# Patient Record
Sex: FEMALE | Race: WHITE | NOT HISPANIC OR LATINO | URBAN - NONMETROPOLITAN AREA
[De-identification: names, ages, dates, MRNs, and addresses within clinical notes are randomized per-mention and may not be internally consistent; named-entity substitution may affect disease eponyms.]

---

## 2017-11-20 PROBLEM — Z96.1: Noted: 2017-11-20

## 2017-11-20 PROBLEM — H26.492: Noted: 2017-11-20

## 2017-11-20 PROBLEM — H35.363: Noted: 2017-11-20

## 2017-11-20 PROBLEM — H35.3131: Noted: 2017-11-20

## 2017-11-20 PROBLEM — H16.223: Noted: 2017-11-20

## 2017-11-20 PROBLEM — D31.31: Noted: 2017-11-20

## 2017-11-20 PROBLEM — H40.1131: Noted: 2017-11-20

## 2019-03-01 NOTE — PATIENT DISCUSSION
1.   Corneal epithelial dysplasia inferior. Risk of ROXANE/SCCA developing discussed. Recommend superficial keratectomy to remove abnormal corneal epithelium OD/OS 1 month apart. Valium 5mg. Risk of infection discussed. 2. Autoimmune related. Treatment includes frequent preservative free tears q1-2 hours topical steroids lotemax prn watch IOP topical restasis/ xiidra pt tried in past will review Dr Alejandra Jewell records to determine is she had any clinical improvement on drops. Pt taking many oral medications which will inhibit tear production3. 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 4. Pseudophakia OU - IOLs stable. Monitor. Return for an appointment for Minor Procedure. with Dr. Crystal Esparza. Eforce obtained reviewed with Doctor advised  and report obtained. Patient does have a increased risk.

## 2019-03-01 NOTE — PATIENT DISCUSSION
Autoimmune related. Treatment includes frequent preservative free tears q1-2 hours topical steroids lotemax prn watch IOP topical restasis/ xiidra pt tried in past will review Dr Mercedez Reis records to determine is she had any clinical improvement on drops.

## 2019-03-15 ENCOUNTER — IMPORTED ENCOUNTER (OUTPATIENT)
Dept: URBAN - NONMETROPOLITAN AREA CLINIC 1 | Facility: CLINIC | Age: 84
End: 2019-03-15

## 2019-03-15 PROCEDURE — 92014 COMPRE OPH EXAM EST PT 1/>: CPT

## 2019-03-15 PROCEDURE — 92250 FUNDUS PHOTOGRAPHY W/I&R: CPT

## 2019-03-15 NOTE — PATIENT DISCUSSION
ARMD OU - Discussed diagnosis in detail with patient- Recommend  patient continue eye vitamins daily such as Preservision.- Recommend that patient continue following the [address] patient to call or come into the office ASAP if any changes noted from today. Grid given today with instructions on how to use 3/15/19- OCT of macula done previously stable findings OU.  - Optos done today shows drusen OU but stable - Continue to monitorPOAG OU mild- Discussed diagnosis in detail with patient- OCT done previously OD shows Superior NFL thinning Borderline Nasal NFL thinning and OS shows No NFL thinning - VF done previously OD Inferior Nasal defect and OS Inferior defect- IOP stable at 14 OU- Cup to Disc noted at OD 0.75 and OS 0.65- Continue Latanoprost QHS OU Rx sent to pharmacy - Monitor PRNChoroidal Nevus OD- Discussed diagnosis with patient- Appears stable in size and is flat- Will continue to monitor closelyPseudophakia OU- Discussed diagnosis with patient- Both intraocular lenses are in place today- Trace PCO noted OS but stable and no treatment needed at this time - Will continue to monitorDES OU- Discussed diagnosis in detail with patient- Discussed signs and symptoms of progression- Recommend drinking plenty of water and starting Omega 3's - Recommend Refresh or systane through out the day- Continue to monitorDermatochalasis OU- Discussed diagnosis in detail with patient- No superior field of vision loss- Continue to monitorPresbyopia OU- Discussed diagnosis in detail with patient - Continue to sydni; 's Notes: VF 3/19/18OCT mac 11/16/2018OCT [facility] 7/16/18Optos 3/15/19MR 7/16/18

## 2019-03-27 NOTE — PATIENT DISCUSSION
SK performed without difficulty BCL placed on affected eye. Good fit. PO drops Moxifloxacin 3x/d Lotemax 3x/d prolensa qd PF tears [id number] for an appointment for post op exam. with Dr. Rosalba Izaguirre.

## 2019-04-03 NOTE — PATIENT DISCUSSION
1.  Post Operative: s/p SK Doing well po drops as instructed. DC moxifloxacin and prolensa Lotemax 2x/d for a week qd for  a week then dc tears prn Call with any problems. Monitor for recurrence of abnormal surface cells. 2. PVD OS: Patient was cautioned to call our office immediately if they experience a substantial change in their symptoms such as an increase in floaters persistent flashes loss of visual field (may appear as a shadow or a curtain) or decrease in visual acuity as these may indicate a retinal tear or detachment. If this is a new problem patient will need to return for re-examination  as determined by the physicianNo new holes or tears. CR Scar OS stableNon-specific irritation OS can use prolensa prn.3. Pseudophakia OU - IOLs stable. Monitor. 4. Return for an appointment for Minor Procedure and post op exam. with Dr. Lara Chang.

## 2019-04-03 NOTE — PATIENT DISCUSSION
PVD OS: Patient was cautioned to call our office immediately if they experience a substantial change in their symptoms such as an increase in floaters persistent flashes loss of visual field (may appear as a shadow or a curtain) or decrease in visual acuity as these may indicate a retinal tear or detachment. If this is a new problem patient will need to return for re-examination  as determined by the physicianNo new holes or tears. CR Scar OS stableNon-specific irritation OS can use prolensa prn.

## 2019-04-03 NOTE — PATIENT DISCUSSION
Return for an appointment for Minor Procedure and post op exam. with Dr. Yaneth Cole. complains of pain/discomfort

## 2019-04-24 NOTE — PATIENT DISCUSSION
1.  Post Operative: Doing well po drops as instructed. tears prn Call with any problems. add retaine pm qhs.2. Discussed nodules and induced astigmatism. Pt for excision and removal of abnormal epithelium. Return for an appointment for post op exam. with Dr. Nicole Turner.

## 2019-07-26 ENCOUNTER — IMPORTED ENCOUNTER (OUTPATIENT)
Dept: URBAN - NONMETROPOLITAN AREA CLINIC 1 | Facility: CLINIC | Age: 84
End: 2019-07-26

## 2019-07-26 PROBLEM — Z96.1: Noted: 2017-11-20

## 2019-07-26 PROBLEM — H26.492: Noted: 2017-11-20

## 2019-07-26 PROBLEM — H16.223: Noted: 2017-11-20

## 2019-07-26 PROBLEM — H40.1131: Noted: 2019-07-26

## 2019-07-26 PROBLEM — H35.3131: Noted: 2019-07-26

## 2019-07-26 PROBLEM — H35.363: Noted: 2019-07-26

## 2019-07-26 PROBLEM — D31.31: Noted: 2017-11-20

## 2019-07-26 PROCEDURE — 92014 COMPRE OPH EXAM EST PT 1/>: CPT

## 2019-07-26 PROCEDURE — 92083 EXTENDED VISUAL FIELD XM: CPT

## 2019-07-26 PROCEDURE — 92133 CPTRZD OPH DX IMG PST SGM ON: CPT

## 2019-07-26 NOTE — PATIENT DISCUSSION
POAG OU mild- Discussed diagnosis in detail with patient- OCT done today OD shows no progression stable from last scan- VF done today very unreliable patient heart attack last month and d/t this issue we will repeat VF next visit. - IOP stable at 10 OU - Cup to Disc noted at OD 0.75 and OS 0.65- Continue Latanoprost QHS OU Rx sent to pharmacy - Monitor PRNARMD OU - Discussed diagnosis in detail with patient- Recommend  patient continue eye vitamins daily such as Preservision.- Recommend that patient continue following the [address] patient to call or come into the office ASAP if any changes noted from today. Grid given today with instructions on how to use 3/15/19- OCT of macula done previously stable findings OU.  - Optos done previously hows drusen OU but stable - Continue to monitorChoroidal Nevus OD- Discussed diagnosis with patient- Appears stable in size and is flat- Will continue to monitor closelyPseudophakia OU- Discussed diagnosis with patient- Both intraocular lenses are in place today- Trace PCO noted OS but stable and no treatment needed at this time - Will continue to monitorDES OU- Discussed diagnosis in detail with patient- Discussed signs and symptoms of progression- Recommend drinking plenty of water and starting Omega 3's - Recommend Refresh or systane through out the day- Continue to monitorDermatochalasis OU- Discussed diagnosis in detail with patient- No superior field of vision loss- Continue to monitorPresbyopia OU- Discussed diagnosis in detail with patient -Mr done today no change in RX - Continue to sydni; 's Notes: VF 7/26/19OCT mac 11/16/2018OCT Shawn Dietrich 74 7/26/19 Optos 3/15/19MR 7/16/18

## 2020-07-21 NOTE — PATIENT DISCUSSION
1.  Discussed nodules and induced astigmatism. Inferior some recurrence from dryness and exposure. Recommend serum tears 2-4x/d FML prn irritation2. Autoimmune related. Treatment includes frequent preservative free tears topical steroids topical restasis/ xiidra/ cequa serum tears and punctal occlusion. 3. Pseudophakia OU - IOLs stable. Monitor for changes in vision. 4. 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 Return for an appointment in 6 weeks for office call. with Dr. Mickie Wynn.

## 2020-07-21 NOTE — PATIENT DISCUSSION
Autoimmune related. Treatment includes frequent preservative free tears topical steroids topical restasis/ xiidra/ cequa serum tears and punctal occlusion.

## 2020-09-01 NOTE — PATIENT DISCUSSION
1.  Discussed nodules and induced astigmatism. Inferior some recurrence from dryness and exposure. [facility] [place] not tolerated well( pt feels drops got warm sitting in front of her house) tears 2-4x/d FML prn irritation. Discussed new AM drop Revital eyes. REcommend trial 2x/d.2. Autoimmune related. Treatment includes frequent preservative free tears topical steroids topical restasis/ xiidra/ cequa serum tears and punctal occlusion. 3. Pseudophakia OU - IOLs stable. Monitor for changes in vision. 4. 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 with Dr. Sherin Berman.

## 2022-04-09 ASSESSMENT — VISUAL ACUITY
OS_CC: 20/40-2
OD_CC: 20/25-
OD_SC: 20/25-
OS_SC: 20/30
OD_CC: J1+
OS_CC: J1+

## 2022-04-09 ASSESSMENT — PACHYMETRY
OS_CT_UM: 656; ADJ: THICK
OD_CT_UM: 590; ADJ: THICK
OD_CT_UM: 590; ADJ: THICK
OS_CT_UM: 656; ADJ: THICK

## 2022-04-09 ASSESSMENT — TONOMETRY
OD_IOP_MMHG: 10
OS_IOP_MMHG: 14
OS_IOP_MMHG: 10
OD_IOP_MMHG: 14